# Patient Record
Sex: FEMALE | Race: WHITE | NOT HISPANIC OR LATINO | URBAN - NONMETROPOLITAN AREA
[De-identification: names, ages, dates, MRNs, and addresses within clinical notes are randomized per-mention and may not be internally consistent; named-entity substitution may affect disease eponyms.]

---

## 2019-02-25 NOTE — PROCEDURE NOTE: CLINICAL
PROCEDURE NOTE: Removal of Conjunctival FB, Superficial #1 OS. Diagnosis: Foreign Body in Conjunctival Sac, Unspecified Eye. Anesthesia: Topical. Prep: Antibiotic Drops q 5min x 3. Prior to treatment, the risks/benefits/alternatives were discussed. The patient wished to proceed with procedure. Superficial conjunctival FB removed with forcep/needle. Globe and conjunctiva intact. Patient tolerated procedure well. There were no complications. Post procedure instructions given. Kaz Villalobos

## 2020-01-16 ENCOUNTER — IMPORTED ENCOUNTER (OUTPATIENT)
Dept: URBAN - NONMETROPOLITAN AREA CLINIC 1 | Facility: CLINIC | Age: 55
End: 2020-01-16

## 2020-01-16 PROBLEM — H52.03: Noted: 2020-01-16

## 2020-01-16 PROBLEM — H04.412: Noted: 2020-01-16

## 2020-01-16 PROBLEM — H52.4: Noted: 2020-01-16

## 2020-01-16 PROCEDURE — 92004 COMPRE OPH EXAM NEW PT 1/>: CPT

## 2020-01-16 PROCEDURE — 92015 DETERMINE REFRACTIVE STATE: CPT

## 2020-01-16 NOTE — PATIENT DISCUSSION
Hyperopia-Discussed diagnosis with patient. Presbyopia-Discussed diagnosis with patient. Updated spec Rx given. Recommend lens that will provide comfort as well as protect safety and health of eyes. chronic dacryocysitis oscont cipro qid os  x 1wk then restart per flaresstart keflex 500 mg tid po x 1wk

## 2022-04-10 ASSESSMENT — TONOMETRY
OD_IOP_MMHG: 18
OS_IOP_MMHG: 28
OS_IOP_MMHG: 18
OD_IOP_MMHG: 28

## 2022-04-10 ASSESSMENT — VISUAL ACUITY
OS_CC: 20/25-2
OD_CC: 20/30

## 2023-04-06 ENCOUNTER — NEW PATIENT (OUTPATIENT)
Dept: RURAL CLINIC 1 | Facility: CLINIC | Age: 58
End: 2023-04-06

## 2023-04-06 DIAGNOSIS — H04.412: ICD-10-CM

## 2023-04-06 DIAGNOSIS — H52.4: ICD-10-CM

## 2023-04-06 DIAGNOSIS — H52.03: ICD-10-CM

## 2023-04-06 DIAGNOSIS — H25.9: ICD-10-CM

## 2023-04-06 DIAGNOSIS — H16.223: ICD-10-CM

## 2023-04-06 PROCEDURE — 92015 DETERMINE REFRACTIVE STATE: CPT

## 2023-04-06 PROCEDURE — 92004 COMPRE OPH EXAM NEW PT 1/>: CPT

## 2023-04-06 ASSESSMENT — VISUAL ACUITY
OU_SC: 20/30
OD_SC: 20/30
OS_SC: 20/40-1
OD_SC: 20/100
OU_SC: 20/100
OS_SC: 20/100

## 2023-04-06 ASSESSMENT — TONOMETRY
OS_IOP_MMHG: 26
OD_IOP_MMHG: 28